# Patient Record
Sex: MALE | Race: WHITE | Employment: FULL TIME | URBAN - METROPOLITAN AREA
[De-identification: names, ages, dates, MRNs, and addresses within clinical notes are randomized per-mention and may not be internally consistent; named-entity substitution may affect disease eponyms.]

---

## 2018-04-09 ENCOUNTER — OFFICE VISIT (OUTPATIENT)
Dept: HEMATOLOGY | Age: 58
End: 2018-04-09

## 2018-04-09 VITALS
DIASTOLIC BLOOD PRESSURE: 94 MMHG | OXYGEN SATURATION: 99 % | HEIGHT: 66 IN | SYSTOLIC BLOOD PRESSURE: 138 MMHG | BODY MASS INDEX: 37.28 KG/M2 | WEIGHT: 232 LBS | TEMPERATURE: 98.7 F | RESPIRATION RATE: 18 BRPM | HEART RATE: 74 BPM

## 2018-04-09 DIAGNOSIS — K76.0 NAFL (NONALCOHOLIC FATTY LIVER): Primary | ICD-10-CM

## 2018-04-09 DIAGNOSIS — K76.0 NAFL (NONALCOHOLIC FATTY LIVER): ICD-10-CM

## 2018-04-09 PROBLEM — K59.00 CONSTIPATION: Status: ACTIVE | Noted: 2018-04-09

## 2018-04-09 PROBLEM — E78.00 HYPERCHOLESTEREMIA: Status: ACTIVE | Noted: 2018-04-09

## 2018-04-09 PROBLEM — K21.9 GERD (GASTROESOPHAGEAL REFLUX DISEASE): Status: ACTIVE | Noted: 2018-04-09

## 2018-04-09 PROBLEM — M10.9 GOUT: Status: ACTIVE | Noted: 2018-04-09

## 2018-04-09 NOTE — MR AVS SNAPSHOT
[address] [place] [address] 
[phone] Patient: Renée Wright MRN: [id number] AON:[birth year]98 Visit Information Date & Time Provider Department Dept. Phone Encounter #  
 4/9/2018  1:05 PM Nury Aguilar MD [facility] [address] [id number] Upcoming Health Maintenance Date Due Hepatitis C Screening 1960 FOBT Q 1 YEAR AGE 50-75 6/13/2009 Influenza Age 5 to Adult 8/1/2017 DTaP/Tdap/Td series (2 - Td) 1/5/2028 Allergies as of 4/9/2018  Review Complete On: 4/9/2018 By: Nury Aguilar MD  
  
 Severity Noted Reaction Type Reactions Stadol [Butorphanol Tartrate]  01/05/2018    Other (comments) Hypotension and bradycardia Current Immunizations  Never Reviewed Name Date Tdap 1/5/2018  8:50 AM  
  
 Not reviewed this visit You Were Diagnosed With   
  
 Codes Comments NAFL (nonalcoholic fatty liver)    -  Primary ICD-10-CM: K76.0 ICD-9-CM: 571.8 Vitals BP Pulse Temp Resp Height(growth percentile) (!) 138/94 (BP 1 Location: Right arm, BP Patient Position: Sitting) 74 98.7 °F (37.1 °C) (Tympanic) 18 5' 6.25\" (1.683 m) Weight(growth percentile) SpO2 BMI Smoking Status 232 lb (105.2 kg) 99% 37.16 kg/m2 Never Smoker BMI and BSA Data Body Mass Index Body Surface Area  
 37.16 kg/m 2 2.22 m 2 Preferred Pharmacy Pharmacy Name Phone 0156 [facility], [address] [place] [address]  [phone] Your Updated Medication List  
  
   
This list is accurate as of 4/9/18  2:28 PM.  Always use your most recent med list.  
  
  
  
  
 allopurinol 100 mg tablet Commonly known as:  Magalene Oyster Take 200 mg by mouth daily. HYDROcodone-acetaminophen 5-325 mg per tablet Commonly known as:  Benjie Nancy  
 Take 1 Tab by mouth every six (6) hours as needed for Pain. Max Daily Amount: 4 Tabs. ibuprofen 600 mg tablet Commonly known as:  MOTRIN Take 1 Tab by mouth every eight (8) hours as needed for Pain for up to 20 doses. PREVACID 30 mg capsule Generic drug:  lansoprazole Take 30 mg by mouth Daily (before breakfast). TRICOR 145 mg tablet Generic drug:  fenofibrate nanocrystallized Take 145 mg by mouth daily. ZOCOR 40 mg tablet Generic drug:  simvastatin Take 40 mg by mouth nightly. To-Do List   
 04/09/2018 Lab:  HEPATIC FUNCTION PANEL Introducing [facility] SERVICES! Caesar Coulter introduces [employer] patient portal. Now you can access parts of your medical record, email your doctor's office, and request medication refills online. 1. In your internet browser, go to [email or web address] 2. Click on the First Time User? Click Here link in the Sign In box. You will see the New Member Sign Up page. 3. Enter your [employer] Access Code exactly as it appears below. You will not need to use this code after youve completed the sign-up process. If you do not sign up before the expiration date, you must request a new code. · [employer] Access Code: [email or web address] Expires: 7/8/2018  2:27 PM 
 
4. Enter the last four digits of your Social Security Number (xxxx) and Date of Birth (mm/dd/yyyy) as indicated and click Submit. You will be taken to the next sign-up page. 5. Create a [employer] ID. This will be your [employer] login ID and cannot be changed, so think of one that is secure and easy to remember. 6. Create a [employer] password. You can change your password at any time. 7. Enter your Password Reset Question and Answer. This can be used at a later time if you forget your password. 8. Enter your e-mail address. You will receive e-mail notification when new information is available in [address] 9. Click Sign Up. You can now view and download portions of your medical record. 10. Click the Download Summary menu link to download a portable copy of your medical information. If you have questions, please visit the Frequently Asked Questions section of the [employer] website. Remember, [employer] is NOT to be used for urgent needs. For medical emergencies, dial 911. Now available from your iPhone and Android! Please provide this summary of care documentation to your next provider. Your primary care clinician is listed as [place]. If you have any questions after today's visit, please call [phone].

## 2018-04-09 NOTE — PROGRESS NOTES
70 Ena Novoa MD, [address], [place], [place]       Maranda Lowry, SANTHOSH Nash, MILES Musa, [facility]   Bobbi Grant, NP    Arsen Mendoza, SANTHOSH Santos [facility] [address]    at [place] [address] [address], [address] [address]    [phone]    FAX: [address] [place] [address], [address], [address]    [phone]    FAX: [phone]         Patient Care Team:  Radha Cannon DO as PCP - General (Family Practice)  Veena Herrera MD as Care Manager (Gastroenterology)      Problem List  Date Reviewed: 4/9/2018          Codes Class Noted    Fatty liver ICD-10-CM: K76.0  ICD-9-CM: 571.8  4/9/2018        Hypercholesteremia ICD-10-CM: E78.00  ICD-9-CM: 272.0  4/9/2018        Constipation ICD-10-CM: K59.00  ICD-9-CM: 564.00  4/9/2018        Gout ICD-10-CM: M10.9  ICD-9-CM: 274.9  4/9/2018        GERD (gastroesophageal reflux disease) ICD-10-CM: K21.9  ICD-9-CM: 530.81  4/9/2018                The physicians listed above have asked me to see Liliane Mcknight in consultation regarding suspected fatty liver disease and its management. The patient is a 61 y.o.  male who is suspected to have fatty liver disease based upon CT scan performed for  evaluation non-specific abdominal pain. Serologic evaluation was either not perfomred or results not available. CT scan of the liver was performed in 11/2017. The results of the imaging suggested fatty liver disease. An assessment of the liver with Fibroscan was perfomred in 1/2018. This demonstrated a CAP value of 400 and liver stiff ness value of 5.5. This is consistent with fatty liver and none-mild fibrosis.     The most recent laboratory studies indicate that the liver transaminases are elevated, ALP is normal, tests of hepatic synthetic and metabolic function are normal, and the platelet count is normal.  depressed. The patient has no symptoms which could be attributed to the liver disorder. The patient completes all daily activities without any functional limitations. The patient has not experienced fatigue,       ALLERGIES  Allergies   Allergen Reactions    Stadol [Butorphanol Tartrate] Other (comments)     Hypotension and bradycardia         MEDICATIONS  Current Outpatient Prescriptions   Medication Sig    simvastatin (ZOCOR) 40 mg tablet Take 40 mg by mouth nightly.  allopurinol (ZYLOPRIM) 100 mg tablet Take 200 mg by mouth daily.  fenofibrate nanocrystallized (TRICOR) 145 mg tablet Take 145 mg by mouth daily.  lansoprazole (PREVACID) 30 mg capsule Take 30 mg by mouth Daily (before breakfast).  ibuprofen (MOTRIN) 600 mg tablet Take 1 Tab by mouth every eight (8) hours as needed for Pain for up to 20 doses.  HYDROcodone-acetaminophen (NORCO) 5-325 mg per tablet Take 1 Tab by mouth every six (6) hours as needed for Pain. Max Daily Amount: 4 Tabs. No current facility-administered medications for this visit. SYSTEM REVIEW NOT RELATED TO LIVER DISEASE OR REVIEWED ABOVE:  Constitution systems: Negative for fever, chills, weight gain, weight loss. Eyes: Negative for visual changes. ENT: Negative for sore throat, painful swallowing. Respiratory: Negative for cough, hemoptysis, SOB. Cardiology: Negative for chest pain, palpitations. GI:  Negative for constipation or diarrhea. : Negative for urinary frequency, dysuria, hematuria, nocturia. Skin: Negative for rash. Hematology: Negative for easy bruising, blood clots. Musculo-skelatal: Negative for back pain, muscle pain, weakness. Neurologic: Negative for headaches, dizziness, vertigo, memory problems not related to HE. Psychology: Negative for anxiety, depression. FAMILY HISTORY:  The father  of unknown cause. The mother is alive and healthy. There is no family history of liver disease. SOCIAL HISTORY:  The patient is . The patient has 5 children, and 5 grandchildren. The patient has never used tobacco products. The patient has never consumed significant amounts of alcohol. 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 PHYSICAL EXAMINATION:  Visit Vitals    BP (!) 138/94 (BP 1 Location: Right arm, BP Patient Position: Sitting)    Pulse 74    Temp 98.7 °F (37.1 °C) (Tympanic)    Resp 18    Ht 5' 6.25\" (1.683 m)    Wt 232 lb (105.2 kg)    SpO2 99%    BMI 37.16 kg/m2     General: No acute distress. Eyes: Sclera anicteric. ENT: No oral lesions. Thyroid normal.  Nodes: No adenopathy. Skin: No spider angiomata. No jaundice. No palmar erythema. Respiratory: Lungs clear to auscultation. Cardiovascular: Regular heart rate. No murmurs. No JVD. Abdomen: Soft non-tender. Liver size normal to percussion/palpation. Spleen not palpable. No obvious ascites. Extremities: No edema. No muscle wasting. No gross arthritic changes. Neurologic: Alert and oriented. Cranial nerves grossly intact. No asterixis. LABORATORY STUDIES:  From 2018  AST/ALT/ALP/T Bili/ALB:  29/44/44/0.2/4.5  WBC/HB/PLT/INR:  7.0/13.9/247  BUN/CREAT:  16/1.3  HBA1C 6.0    SEROLOGIES:  Not available or performed. Testing was performed today. LIVER HISTOLOGY:  2018. FibroScan performed at [facility]. EkPa was 5.5. IQR/med 16%. . The results suggested a fibrosis level of F0-1. ENDOSCOPIC PROCEDURES:  2017. Colonoscopy by Dr Jose Luis Garcia. Normal    RADIOLOGY:  Not available or performed    OTHER TESTING:  Not available or performed    ASSESSMENT AND PLAN:  Suspected fatty liver disease based upon elevated CAP value on fibrosis. .    Fibroscan suggests mild NAFL with stage 0-1 Fibrosis. It is highly unlikely that the patient has LIZ. AST is normal.  The ALT is elevated. Alkaline phosphate is normal.  Liver function is normal.  The platelet count is normal.      Based upon laboratory studies, elastography and imaging  the patient does not appear to have any significant liver injury. The office notes indicate that serologic and virologic studies for other causes of liver disease were performed. I do not have these result but will request these from [facility] and Dr Edita Mcgill office. The benign form of fatty liver disease should not progress to fibrosis or cirrhosis. However, because the liver enzymes are elevated the patient should be monitored periodically. There is no reason to perform liver biopsy at this time. Elastography can be repeated annually to assess for fibrosis progression and need for further evaluation and treatment of the liver disorder. The patient was counseled regarding diet and exercise to achieve weight loss. The best diet for patients with fatty liver is one very low in carbohydrates and enriched with protein such as an Atkins program.      The patient was told to to consume any food products and drinks containing fructose as this enhances hepatic fat synthesis. There is no medication of vitamin supplements that we advocate for LIZ. Using glitazones in patients without diabetes mellitus has been shown to reduce fat content in the liver but has no effect on fibrosis and is associated with weight gain. Vitamin E has also been used but the data is not very good and most experts no longer advocate this. The only medical treatments for LIZ are though clinical trials. The patient fatty liver/NAFL that is likely too mild to be included in clinical trials. The patient was directed to continue all current medications at the current dosages.   There are no contraindications for the patient to take any medications that are necessary for treatment of other medical issues including medications for diabetes mellitus and hypercholesterolemia. The patient was counseled regarding alcohol consumption and that this could contribute to fatty liver disease. The need for vaccination against viral hepatitis A and B will be assessed with serologic and instituted as appropriate. All of the above issues were discussed with the patient. All questions were answered. The patient expressed a clear understanding of the above. The patient would like to continue monitoring with Dr Omid Avery office. No follow-up at [address] is needed. I would be glad to see the patient back for follow-up at any time in the future if the clinical situation changes.       Kelli Potter MD  [facility] [address], [address] [place] [address] Raquel Orr, [address] [place] [address] Street [address]  [phone]